# Patient Record
Sex: MALE
[De-identification: names, ages, dates, MRNs, and addresses within clinical notes are randomized per-mention and may not be internally consistent; named-entity substitution may affect disease eponyms.]

---

## 2023-04-22 ENCOUNTER — NURSE TRIAGE (OUTPATIENT)
Dept: OTHER | Facility: CLINIC | Age: 48
End: 2023-04-22

## 2023-04-22 NOTE — TELEPHONE ENCOUNTER
Location of patient: [facility] MRN: [id number]    Provider: Dr. Minnie Pat    Patient has an appointment this afternoon, but states he will be coming into town for other business and would like a sooner appointment if possible. Transferred to [facility] at the contact center for scheduling questions. This triage is a result of a call to the [place] [id number]    Reason for Disposition   Requesting regular office appointment    Protocols used:  Information Only Call - No Triage-ADULT-